# Patient Record
Sex: MALE | Race: WHITE | Employment: STUDENT | URBAN - METROPOLITAN AREA
[De-identification: names, ages, dates, MRNs, and addresses within clinical notes are randomized per-mention and may not be internally consistent; named-entity substitution may affect disease eponyms.]

---
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
## 2024-11-08 ENCOUNTER — OFFICE VISIT (OUTPATIENT)
Age: 24
End: 2024-11-08

## 2024-11-08 VITALS
SYSTOLIC BLOOD PRESSURE: 127 MMHG | TEMPERATURE: 98.6 F | RESPIRATION RATE: 15 BRPM | BODY MASS INDEX: 24.64 KG/M2 | DIASTOLIC BLOOD PRESSURE: 88 MMHG | OXYGEN SATURATION: 96 % | HEIGHT: 71 IN | HEART RATE: 107 BPM | WEIGHT: 176 LBS

## 2024-11-08 DIAGNOSIS — R11.2 NAUSEA AND VOMITING, UNSPECIFIED VOMITING TYPE: ICD-10-CM

## 2024-11-08 DIAGNOSIS — R10.9 BILATERAL FLANK PAIN: ICD-10-CM

## 2024-11-08 DIAGNOSIS — J01.90 ACUTE BACTERIAL SINUSITIS: Primary | ICD-10-CM

## 2024-11-08 DIAGNOSIS — B96.89 ACUTE BACTERIAL SINUSITIS: Primary | ICD-10-CM

## 2024-11-08 DIAGNOSIS — R05.1 ACUTE COUGH: ICD-10-CM

## 2024-11-08 LAB
BILIRUBIN, URINE, POC: NORMAL
BLOOD URINE, POC: NEGATIVE
GLUCOSE URINE, POC: NEGATIVE
KETONES, URINE, POC: NORMAL
LEUKOCYTE ESTERASE, URINE, POC: NEGATIVE
NITRITE, URINE, POC: NEGATIVE
PH, URINE, POC: 6 (ref 4.6–8)
PROTEIN,URINE, POC: NORMAL
SPECIFIC GRAVITY, URINE, POC: 1.02 (ref 1–1.03)
URINALYSIS CLARITY, POC: CLEAR
URINALYSIS COLOR, POC: NORMAL
UROBILINOGEN, POC: NORMAL MG/DL

## 2024-11-08 RX ORDER — ONDANSETRON 4 MG/1
4 TABLET, ORALLY DISINTEGRATING ORAL 3 TIMES DAILY PRN
Qty: 21 TABLET | Refills: 0 | Status: SHIPPED | OUTPATIENT
Start: 2024-11-08

## 2024-11-08 RX ORDER — ALBUTEROL SULFATE 90 UG/1
INHALANT RESPIRATORY (INHALATION)
Qty: 18 G | Refills: 0 | Status: SHIPPED | OUTPATIENT
Start: 2024-11-08

## 2024-11-08 RX ORDER — DOXYCYCLINE HYCLATE 100 MG
100 TABLET ORAL 2 TIMES DAILY
Qty: 20 TABLET | Refills: 0 | Status: SHIPPED | OUTPATIENT
Start: 2024-11-08 | End: 2024-11-18

## 2024-11-08 RX ORDER — BENZONATATE 200 MG/1
200 CAPSULE ORAL 3 TIMES DAILY PRN
Qty: 30 CAPSULE | Refills: 0 | Status: SHIPPED | OUTPATIENT
Start: 2024-11-08 | End: 2024-11-18

## 2024-11-08 NOTE — PATIENT INSTRUCTIONS
Acute bacterial sinusitis -  Doxycycline, twice daily for 10 days  Tessalon, every 8 hours as needed for cough  Albuterol, 1-2 puffs every 6 hours as needed  Recommended over-the-counter expectorant (guaifenesin), such as Mucinex  Zofran, every 8 hours as needed for nausea  Recommend over-the-counter antihistamines and/or decongestants as needed  Acetaminophen or ibuprofen as needed for headache/fever  Drink plenty fluids  Recommend follow-up with your primary care doctor early next week  If any worsening go to the nearest emergency room    Thank you for visiting [facility] today    Nasal Congestion:  Flonase or Nasonex (over the counter) nasal spray, once a day  Normal saline nasal spray  Afrin nasal spray no longer than three days  If you have high blood pressure, take Coricidin HBP (or the generic form) instead. Follow instructions on the box  Cough:  Throat lozenges, hot tea, and honey may help  Vicks VapoRub at night to help with cough and relieve muscles aches and pain  If not prescribed a cough medication, Robitussin DM is an option. It is an over the counter cough medication containing dextromethorphan to help suppress cough at night   *Please only take when absolutely needed, as this is a controlled substance that can cause addiction   *Please only take cough syrup at nighttime as it causes drowsiness   *Do not drive or operate any machinery while taking this medication  Chest Congestion:  For thick mucus, take Mucinex (with Guafenesin only) to help thin the mucus. Follow instructions on the box. You will need to drink plenty of water with this medication  Sore Throat:  Lozenges, as needed. Cepacol lozenges will help numb the throat  Chloraseptic spray also helps to numb throat pain  Salt water gargles (1/4 tsp salt in 8 oz of water) to soothe throat pain  Sinus pain/pressure:  Warm, wet towel on face to help with facial sinus pain/pressure  Headache/Pain Fever/Body Aches:  If you can take

## 2024-11-08 NOTE — PROGRESS NOTES
Payam Og (:  2000) is a 24 y.o. male,New patient, here for evaluation of the following chief complaint(s):  Cold Symptoms (Pt c/o fever, cough with mucus and blood spots, headache, chest tightness, for 1 week. He's taken theraflu, dayquil, augmentin with no relief.)        SUBJECTIVE/OBJECTIVE:    History provided by:  Patient  Cold Symptoms   Associated symptoms include diarrhea.        24 y.o. male presents with symptoms of:    Patient states his symptoms started Tuesday before last (about 10 days ago) with cough, nasal congestion, sinus pressure, and then fever started on Friday.    Went to primary care, states all testing including strep COVID and flu and a urine was all normal.    Also admits to nausea and vomiting after he eats anything.    Took 8 tabs of Augmentin he had left over with no improvement.    Admits to bilateral flank pain, no dysuria, no hematuria, no urgency or increased frequency.    He is a non-smoker, never a smoker.         Vitals:    24 1222   BP: 127/88   Site: Right Upper Arm   Position: Sitting   Cuff Size: Medium Adult   Pulse: (!) 107   Resp: 15   Temp: 98.6 °F (37 °C)   TempSrc: Oral   SpO2: 96%   Weight: 79.8 kg (176 lb)   Height: 1.803 m (5' 11\")       Results for orders placed or performed in visit on 24   AMB POC URINALYSIS DIP STICK AUTO W/ MICRO   Result Value Ref Range    Color (UA POC) Orange     Clarity (UA POC) Clear     Glucose, Urine, POC Negative     Bilirubin, Urine, POC 1+     Ketones, Urine, POC 4+     Specific Gravity, Urine, POC 1.020 1.001 - 1.035    Blood (UA POC) Negative     pH, Urine, POC 6.0 4.6 - 8.0    Protein, Urine, POC Trace     Urobilinogen, POC Normal <1.1 mg/dL    Nitrite, Urine, POC Negative Negative    Leukocyte Esterase, Urine, POC Negative         Physical Exam  Constitutional:       General: He is not in acute distress.     Appearance: Normal appearance. He is not ill-appearing or toxic-appearing.   HENT:      Head: